# Patient Record
(demographics unavailable — no encounter records)

---

## 2025-02-04 NOTE — REASON FOR VISIT
[CV Risk Factors and Non-Cardiac Disease] : CV risk factors and non-cardiac disease [Arrhythmia/ECG Abnorrmalities] : arrhythmia/ECG abnormalities [Structural Heart and Valve Disease] : structural heart and valve disease [Coronary Artery Disease] : coronary artery disease [Follow-Up - Clinic] : a clinic follow-up of [Atrial Fibrillation] : atrial fibrillation [Hyperlipidemia] : hyperlipidemia [Hypertension] : hypertension [Palpitations] : palpitations [FreeTextEntry2] : Atrial Fibrillation and palpations   [FreeTextEntry1] : Left Bundle Branch Block, hypertrophic cardiomyopathy, s/p myomectomy, subaortic membrane

## 2025-02-04 NOTE — DISCUSSION/SUMMARY
[FreeTextEntry1] : This is a 68-year-old female past medical history significant for moderate aortic stenosis and moderate aortic valve regurgitation, prediabetes, mild renal insufficiency, status post ablation for supraventricular arrhythmia 2022, (supraventricular tachycardia, periods of atrial fibrillation and atrial tachycardia, subaortic membrane, asymmetric septal hypertrophy, left bundle branch block, atrial fibrillation, hypertension, status post left knee replacement surgery, mitral valve regurgitation, status post right hip replacement surgery in , status post right knee replacement surgery, status post cholecystectomy, status post  section who comes in for cardiac follow-up evaluation. Electrocardiogram done 2025 demonstrated normal sinus rhythm rate 60 bpm is otherwise remarkable for left bundle branch block, first-degree atrioventricular block, nonspecific ST wave changes and poor R wave progression The patient is statin intolerance. Lipid panel done 2025 demonstrated cholesterol 204, HDL 67, triglycerides 61, LDL calculated 122 mg/dL, non-HDL cholesterol 137, LDL direct 122 mg/dL, hemoglobin A1c of 6.4, GFR 52 cc/min, and potassium 4.5. The patient does not feel she has the dexterity to self inject PCSK9 therapy.  She would be an excellent candidate for Inclisiran therapy every 6 months with 3 shots in the first year.  I have explained this to the patient in detail.  She is willing to take the Inclisiran injections. She is instructed to follow-up with her primary care physician.  I suggested she find a physician in [facility]. Electrocardiogram done 2024 demonstrated sinus bradycardia rate of 55 bpm is otherwise remarkable for left bundle branch block and nonspecific ST-T wave changes.  There is left axis deviation. Lipid panel done July 3, 2023 demonstrated cholesterol 207, HDL of 60, triglycerides of 88, LDL direct of 132, non-HDL cholesterol 147 mg/dL and hemoglobin A1c of 6.6.  Echo Doppler examination done December 15, 2022 demonstrated ejection fraction of 55% with minimal mitral valve regurgitation, moderate aortic stenosis, moderate aortic valve regurgitation, mild pulmonic valve regurgitation, minimal tricuspid valve regurgitation.  Electrocardiogram done July 3, 2023 demonstrated normal sinus rhythm rate 61 bpm is otherwise markable for left bundle branch block. Transesophageal echo Doppler examination done at [facility] 2022 demonstrated moderate aortic stenosis and moderate aortic valve regurgitation, minimal mitral valve regurgitation, minimal tricuspid valve regurgitation, mild pulmonic valve regurgitation with satisfactory left ventricular function and estimated ejection fraction of approximately 55% with a mildly dilated left ventricle.  Electrocardiogram done 2022 demonstrated normal sinus rhythm rate of 87 bpm is otherwise remarkable for left atrial enlargement. The patient had a transthoracic echo Doppler examination 2022 at [facility] which demonstrated calcified aortic valve leaflet with decreased opening and a mean gradient of 25 mmHg with a peak gradient 46 mmHg; the subaortic membrane cannot be ruled out there is mild aortic valve regurgitation, minimal mitral valve regurgitation, minimal pulmonic valve regurgitation, minimal tricuspid valve regurgitation, A lipid panel done 2022 demonstrated a cholesterol of 198, HDL 67, triglycerides 74, LDL calculated 114 mg/dL, and LDL direct of 119 mg/dL.  Lipoprotein B was 91 mg/dL, and hemoglobin A1c was 5.9.  Electrocardiogram done 2022 demonstrated sinus bradycardia rate of 48 bpm is otherwise remarkable for left atrial abnormality, left axis deviation, left bundle branch block, and borderline first-degree atrioventricular block. Patient lost vision in her right eye, "stroke", and has been worked up by neurology. Echo Doppler examination done 2022 demonstrates moderate aortic stenosis with a peak gradient 49 mmHg, mean gradient 29 mmHg, with an estimated ejection fraction of 54 to 61%, left atrial enlargement, mild concentric left ventricular hypertrophy, mild tricuspid valve regurgitation, mild pulmonic valve regurgitation, calcified mitral annulus and mild mitral valve regurgitation and thinning of the interatrial septum. Blood work done 2022 demonstrated a cholesterol of 198, HDL 67, triglycerides of 74, and  mg/dL with a direct LDL of 119 mg/dL.  Hemoglobin A1c is 5.9.  She continues Xarelto 20 mg daily in the evening for anticoagulation.   A cardiac catheterization  which revealed no coronary artery disease and evidence for hypertrophic cardiomyopathy.   This patient is cleared from a cardiac standpoint for right hip replacement surgery pending acceptable preoperative laboratory values.  The patient will discontinue Xarelto for full days prior to the procedure.  She will stop aspirin therapy 1 week prior to the procedure. Please avoid overhydration.  Maintain prophylaxis for deep venous thrombosis.  The patient should have an incentive spirometer in the perioperative period.  ADDENDUM 3/28/2024:: Blood work done 2024 including basic metabolic profile, INR, and CBC are acceptable for surgery.  The patient is cleared from a cardiac standpoint for surgery.  Please note the above recommendations.

## 2025-02-04 NOTE — HISTORY OF PRESENT ILLNESS
[Scheduled Procedure ___] : a [unfilled] [Surgeon Name ___] : surgeon: [unfilled] [Good] : Good [Stable] : Stable [Electrocardiogram] : ~T an ECG ~C was performed [Echocardiogram] : ~T an echocardiogram ~C was performed [Cardiovascular Stress Test] : a cardiac stress test ~T ~C was performed [FreeTextEntry1] : This is a 64 year year old female here today for follow up cardiac evaluation. \par  She has a past medical history significant for  subaortic membrane, asymmetric septal hypertrophy, left bundle branch block, atrial fibrillation, hypertension, mitral valve regurgitation. Past surgical history is remarkable for status post right knee replacement surgery, status post cholecystectomy, status post  section. The patient had successful  left knee replacement surgery.\par  \par  CHIEF COMPLAINT:\par  Today she is feeling generally well and does not have any complaints at this time. She is here today for pre-op cardiovascular clearance to be done on 10/11/2021 for a biopsy for temporal arteritis evaluation to be done by Dr. Vaughn Ruiz at the [facility].\par  \par  She denies fever, chills, weight loss, malaise, rash, alteration bowel habits, weakness, abdominal  pain, bloating, changes in urination, visual disturbances, chest pain, headaches, dizziness, heart palpitations, recent episodes of syncope or falls at this time.\par  \par

## 2025-02-04 NOTE — PHYSICAL EXAM
[Well Developed] : well developed [Well Nourished] : well nourished [No Acute Distress] : no acute distress [Normal Venous Pressure] : normal venous pressure [No Carotid Bruit] : no carotid bruit [Normal S1, S2] : normal S1, S2 [No Rub] : no rub [No Pitting Edema] : no pitting edema present [No Abnormalities] : the abdominal aorta was not enlarged and no bruit was heard [Clear Lung Fields] : clear lung fields [Good Air Entry] : good air entry [No Respiratory Distress] : no respiratory distress  [Soft] : abdomen soft [Non Tender] : non-tender [No Masses/organomegaly] : no masses/organomegaly [Normal Bowel Sounds] : normal bowel sounds [Normal Gait] : normal gait [No Edema] : no edema [No Cyanosis] : no cyanosis [No Clubbing] : no clubbing [No Varicosities] : no varicosities [No Rash] : no rash [No Skin Lesions] : no skin lesions [Moves all extremities] : moves all extremities [No Focal Deficits] : no focal deficits [Normal Speech] : normal speech [Alert and Oriented] : alert and oriented [Normal memory] : normal memory [General Appearance - Well Developed] : well developed [Normal Appearance] : normal appearance [Well Groomed] : well groomed [General Appearance - Well Nourished] : well nourished [No Deformities] : no deformities [General Appearance - In No Acute Distress] : no acute distress [Normal Conjunctiva] : the conjunctiva exhibited no abnormalities [Normal Oral Mucosa] : normal oral mucosa [Normal Jugular Venous A Waves Present] : normal jugular venous A waves present [Normal Jugular Venous V Waves Present] : normal jugular venous V waves present [No Jugular Venous Deluca A Waves] : no jugular venous deluca A waves [Respiration, Rhythm And Depth] : normal respiratory rhythm and effort [Exaggerated Use Of Accessory Muscles For Inspiration] : no accessory muscle use [Auscultation Breath Sounds / Voice Sounds] : lungs were clear to auscultation bilaterally [Bowel Sounds] : normal bowel sounds [Abdomen Soft] : soft [Abnormal Walk] : normal gait [Gait - Sufficient For Exercise Testing] : the gait was sufficient for exercise testing [Nail Clubbing] : no clubbing of the fingernails [Cyanosis, Localized] : no localized cyanosis [Skin Color & Pigmentation] : normal skin color and pigmentation [] : no rash [No Venous Stasis] : no venous stasis [Skin Lesions] : no skin lesions [No Skin Ulcers] : no skin ulcer [Impaired Insight] : insight and judgment were intact [Oriented To Time, Place, And Person] : oriented to person, place, and time [Affect] : the affect was normal [Mood] : the mood was normal [No Anxiety] : not feeling anxious [5th Left ICS - MCL] : palpated at the 5th LICS in the midclavicular line [Normal] : normal [No Precordial Heave] : no precordial heave was noted [Normal Rate] : normal [Irregularly Irregular] : irregularly irregular [Normal S1] : normal S1 [Normal S2] : normal S2 [No Gallop] : no gallop heard [II] : a grade 2 [2+] : left 2+ [Nonpitting Edema] : nonpitting edema present [Lt] : varicose veins of the left leg noted [Right Femoral Bruit] : no bruit heard over the right femoral artery [Left Femoral Bruit] : no bruit heard over the left femoral artery [Apical Thrill] : no thrill palpable at the apex [S3] : no S3 [S4] : no S4 [Click] : no click [Pericardial Rub] : no pericardial rub [Right Carotid Bruit] : no bruit heard over the right carotid [Left Carotid Bruit] : no bruit heard over the left carotid [Rt] : no varicose veins of the right leg

## 2025-02-04 NOTE — ASSESSMENT
[FreeTextEntry1] : Prior note nurse practitioner Connie 2024::  This is a 67-year-old female here today for follow-up cardiac evaluation.   She has a past medical history significant for moderate aortic stenosis and moderate aortic valve regurgitation, prediabetes, mild renal insufficiency, s/p ablation for supraventricular arrhythmia 2022 (supraventricular tachycardia, periods of atrial fibrillation and atrial tachycardia), hypertrophic cardiomyopathy with subaortic membrane s/p surgical myomectomy, asymmetric septal hypertrophy, left bundle branch block, atrial fibrillation, hypertension, s/p left knee replacement surgery,  s/p right knee replacement surgery, s/p cholecystectomy, s/p  section, s/p right hip replacement .    HPI: She is feeling generally well today and denies chest pain, dizziness, heart palpitations, recent episodes of syncope or falls, SOB, or dyspnea at this time.  Current Medications: Metoprolol Tartrate 25mg PO BID, Amlodipine 5mg PO DAILY and Telmisartan 40mg PO DAILY.   It was recommended she begin statin therapy after 2024 blood work demonstrated triglycerides 62, cholesterol 219, HDL 68, , non-, LDL direct 149 (LDL goal < 70 mg/dL). She has history of significant joint pain on Rosuvastatin 20 mg daily so was prescribed Atorvastatin 40 mg daily, which she has not yet started. Reports that she didn't want to start right before her hip replacement surgery and then forgot about it. She will begin this starting today and repeat her blood work in 4-6 weeks for reevaluation.   BLOOD PRESSURE: -BP is controlled in today's visit   BLOOD WORK:  *LDL target goal < 70* -New blood work prescription given to patient on 2024 to evaluate lipid profile, CBC, BMP, hepatic function, A1C and TSH in 4-6 weeks.  -Lipid panel done 2024 demonstrated triglycerides 62, cholesterol 219, HDL 68, , non-, LDL direct 149. -Lipid panel done July 3, 2023 demonstrated cholesterol 207, HDL of 60, triglycerides of 88, LDL direct of 132, non-HDL cholesterol 147 mg/dL and hemoglobin A1c of 6.6. -Lipid panel done 2022 demonstrated a cholesterol of 198, HDL 67, triglycerides 74, LDL calculated 114 mg/dL, and LDL direct of 119 mg/dL.  Lipoprotein B was 91 mg/dL, and hemoglobin A1c was 5.9. -New blood work was done 2021 which demonstrated LDL of 135.   TESTING/REPORTS: -Electrocardiogram done 2024 demonstrated sinus bradycardia rate of 55 bpm is otherwise remarkable for left bundle branch block and nonspecific ST-T wave changes.  There is left axis deviation.  -Echo done 2022 demonstrated normal left ventricular systolic function EF 55%, minimal mitral regurgitation, mild to moderate aortic regurgitation mild pulmonic regurgitation, minimal tricuspid regurgitation.  -Electrocardiogram done July 3, 2023 demonstrated normal sinus rhythm rate 61 bpm is otherwise markable for left bundle branch block.  -Echo Doppler examination done 2022 demonstrates moderate aortic stenosis with a peak gradient 49 mmHg, mean gradient 29 mmHg, with an estimated ejection fraction of 54 to 61%, left atrial enlargement, mild concentric left ventricular hypertrophy, mild tricuspid valve regurgitation, mild pulmonic valve regurgitation, calcified mitral annulus and mild mitral valve regurgitation and thinning of the interatrial septum.  -Echo Doppler examination done December 15, 2022 demonstrated ejection fraction of 55% with minimal mitral valve regurgitation, moderate aortic stenosis, moderate aortic valve regurgitation, mild pulmonic valve regurgitation, minimal tricuspid valve regurgitation.  -Electrocardiogram done 2022 demonstrated normal sinus rhythm rate of 87 bpm is otherwise remarkable for left atrial enlargement.  -Electrocardiogram done 2022 demonstrated sinus bradycardia rate of 48 bpm is otherwise remarkable for left atrial abnormality, left axis deviation, left bundle branch block, and borderline first-degree atrioventricular block.  -EKG done Oct 08, 2021 which demonstrated regular sinus rhythm with nonspecific ST-T wave changes, BPM of 56 with LBBB.   -A cardiac catheterization  which revealed no coronary artery disease and evidence for hypertrophic cardiomyopathy.   -Echocardiogram done on 2021 demonstrated normal LVSF EF of 65%.  PLAN: -She will continue with her usual medications and will contact the office if she is having any complaints between now and their next follow up appointment. -The patient will schedule an Echo Doppler examination to evaluate murmur, left ventricular function, chamber size, and rule out hypertrophy.  -She will schedule a bilateral carotid doppler to evaluate for carotid artery stenosis. -LATISHA to be completed at next visit to evaluate for PVD.   I have discussed the plan of care with ALEC BANDA and she will follow up in 4-6 months. They are compliant with all of their medications.     The patient understands that aerobic exercises must be increased to 40 minutes 4 times/week and a detailed discussion of lifestyle modification was done today.   The patient has a good understanding of the diagnosis, treatment plan and lifestyle modification.   They will contact me at the office for any questions with their care or any changes in their health status.   The patient was discussed with supervision physician Dr. Brown Bustillos at the time of the visit and the plan of care will be carried out as noted above.     MICHAEL Rosales NP

## 2025-04-23 NOTE — REVIEW OF SYSTEMS
[Negative] : Heme/Lymph [FreeTextEntry3] :  as per HPI  [FreeTextEntry8] :  as per HPI  [de-identified] :  as per HPI

## 2025-04-23 NOTE — ASSESSMENT
[Vaccines Reviewed] : Immunizations reviewed today. Please see immunization details in the vaccine log within the immunization flowsheet.  [FreeTextEntry1] : .  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 HPI as above.  # HTN- controlled - Counselled on low salt diet, exercise as tolerated, weight loss. - c/w current regimen  # HLD - Counselled on diet, exercise as tolerated, weight loss. - f/u lipid panel - statin intolerant, following with cardio for alternative  # pre-DM - Counselled on diet, exercise as tolerated, weight loss. 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 - optho f/u - neuro  - c/w asa 81mg  # afib s/p ablation - c/w xarelto, tolerating well  # LBBB # HCM s/p myomectomy - cardio f/u  # occasional dizziness - past carotid US 2022 with some plaque; f/u repeat - maintain good hydration, avoid sudden position changes  # chronic urinary incontinence since anesthesia for ortho surgery - urology  # HCM - f/u labs - mammo - DEXA - GYN - never had scope, prefers cologuard  f/u pending above w/u

## 2025-04-23 NOTE — PHYSICAL EXAM
[No Acute Distress] : no acute distress [Well-Appearing] : well-appearing [No Lymphadenopathy] : no lymphadenopathy [No Respiratory Distress] : no respiratory distress  [No Accessory Muscle Use] : no accessory muscle use [Clear to Auscultation] : lungs were clear to auscultation bilaterally [Normal Rate] : normal rate  [Regular Rhythm] : with a regular rhythm [Soft] : abdomen soft [Non Tender] : non-tender [Non-distended] : non-distended [Coordination Grossly Intact] : coordination grossly intact [Normal Affect] : the affect was normal [Normal Insight/Judgement] : insight and judgment were intact [de-identified] : systolic murmur

## 2025-04-23 NOTE — HISTORY OF PRESENT ILLNESS
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 + occasional dizziness, not worse with movements, no room spinning + chronic urinary incontinence since anesthesia for ortho surgery  Sx Hx: cholecystectomy, BL knee replacement, R hip replacement, cardiac myomectomy    Family Hx: Mom, sister- DM   Social Hx: never smoker, no alcohol or illicit drug use. Lives with family. Retired, worked for [employer].

## 2025-04-30 NOTE — ASSESSMENT
[FreeTextEntry1] : 67 yo female with urinary urge incontinence. Reviewed behavioral modifications and emphasized timed voiding to empty before strong urge develops. She will also trial oxybutynin 5 mg daily.  Plan Reviewed labs including Cr 1.09 and urinalysis negative for blood but not microscopic UA with microscopic Urine culture Trial oxybutynin 5 mg daily - prescription sent Timed voiding discussed FU 1 month for PVR and assess improvements in incontinent episodes   Patient is being seen today for evaluation and management of a chronic and longitudinal ongoing condition and I am of the primary treating physician.

## 2025-04-30 NOTE — ASSESSMENT
[FreeTextEntry1] : .  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 HPI as above.  # HTN- controlled - Counselled on low salt diet, exercise as tolerated, weight loss. - c/w amlodipine 5mg po daily, telmisartan 40mg po daily, metoprolol succ 200mg po daily  # HLD - Counselled on diet, exercise as tolerated, weight loss. - recent , pt already started back on statin; c/w atorvastatin 40mg po daily - reports past statin intolerance, will monitor   # pre-DM/DM? - Counselled on diet, exercise as tolerated, weight loss. 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 - optho f/u - neuro - c/w asa 81mg  # afib s/p ablation - c/w xarelto 20mg po daily, tolerating well  # LBBB # HCM s/p myomectomy - cardio f/u  # occasional dizziness - past carotid US 2022 with some plaque; f/u repeat - maintain good hydration, avoid sudden position changes  # chronic urinary incontinence since anesthesia for ortho surgery - urology  # HCM - mammo - DEXA - GYN - never had scope, prefers cologuard  f/u 1-2 weeks

## 2025-04-30 NOTE — HISTORY OF PRESENT ILLNESS
[FreeTextEntry1] : 67 yo female with urge incontinence. States it only occurs when she has urge to void and cannot get to the bathroom. It typically occurs about 1 out of 10 times. She does not leak with valsalva. She states she has had 3 prior ortho surgeries and each time she developed some urinary symptoms. However, symptoms have resolved previously. After her last surgery 2 years ago she developed these symptoms and they have continued  States she voids q3 hrs and nocturia x1. Denies small volume voids, dysuria, gross hematuria.

## 2025-04-30 NOTE — HISTORY OF PRESENT ILLNESS
[Home] : at home, [unfilled] , at the time of the visit. [Medical Office: ([facility])___] : at the medical office located in  [Telephone (audio)] : This telephonic visit was provided via audio only technology. [Patient preference] : patient preference. 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 + occasional dizziness, not worse with movements, no room spinning + chronic urinary incontinence since anesthesia for ortho surgery Recent labs: A1c 6.8 Vit D 28  GFR 55, 40 by cystatin

## 2025-06-10 NOTE — PHYSICAL EXAM
[Normal Appearance] : normal appearance [Well Groomed] : well groomed [Edema] : no peripheral edema [General Appearance - In No Acute Distress] : no acute distress [Exaggerated Use Of Accessory Muscles For Inspiration] : no accessory muscle use [Respiration, Rhythm And Depth] : normal respiratory rhythm and effort [Abdomen Soft] : soft [Abdomen Tenderness] : non-tender [Costovertebral Angle Tenderness] : no ~M costovertebral angle tenderness [Urinary Bladder Findings] : the bladder was normal on palpation [Normal Station and Gait] : the gait and station were normal for the patient's age [] : no rash [No Focal Deficits] : no focal deficits [Oriented To Time, Place, And Person] : oriented to person, place, and time [Mood] : the mood was normal [Affect] : the affect was normal [No Palpable Adenopathy] : no palpable adenopathy

## 2025-06-11 NOTE — PHYSICAL EXAM
[No Acute Distress] : no acute distress [Well-Appearing] : well-appearing [No Respiratory Distress] : no respiratory distress  [No Accessory Muscle Use] : no accessory muscle use [Clear to Auscultation] : lungs were clear to auscultation bilaterally [Normal Rate] : normal rate  [Regular Rhythm] : with a regular rhythm [Coordination Grossly Intact] : coordination grossly intact [Normal Affect] : the affect was normal [Normal Insight/Judgement] : insight and judgment were intact [de-identified] : + murmur

## 2025-06-11 NOTE — ASSESSMENT
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  # HTN- controlled - Counselled on low salt diet, exercise as tolerated, weight loss. - c/w amlodipine 5mg po daily, telmisartan 40mg po daily, metoprolol succ 200mg po daily  # HLD - Counselled on diet, exercise as tolerated, weight loss. - c/w atorvastatin 40mg po daily for now - mild arthralgias on two statins now- f/u ESR, CK - cardio f/u for repatha  # new dx DM - Counselled on diet, exercise as tolerated, weight loss. 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 - optho f/u - neuro - c/w asa 81mg  # afib s/p ablation - c/w xarelto 20mg po daily, tolerating well  # LBBB # HCM s/p myomectomy - cardio f/u  # occasional dizziness - past carotid US 2022 with some plaque; f/u repeat - maintain good hydration, avoid sudden position changes - trial meclizine  # chronic urinary incontinence since anesthesia for ortho surgery - urology today  # HCM - mammo - DEXA - GYN - cologuard positive, scheduled with GI  f/u 3-4 months

## 2025-06-11 NOTE — ASSESSMENT
[FreeTextEntry1] : 69 yo female with urinary urge incontinence. She started oxybutynin 5 mg daily and only noticed she was having improvement in symptoms when she stopped the medication. She continues timed voiding q2hrs but develops urgency. She will increase to 10 mg daily and depending on symptomatic improvement and/or SE profile will continue with 10 mg vs 5 mg daily.    Plan PVR done today - 3 ml demonstrating patient emptying bladder Urinalysis reviewed - 0 rbc/hpf. repeat annually Urine culture reviewed - negative Increase oxybutynin to 10 mg daily - prescription sent Continue timed voiding q2 hrs but with increase in oxybutynin dose can increase to longer intervals FU 1 month for PVR and assess improvements in incontinent episodes   Patient is being seen today for evaluation and management of a chronic and longitudinal ongoing condition and I am of the primary treating physician.

## 2025-06-11 NOTE — HISTORY OF PRESENT ILLNESS
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 + report multiple mild joint pains on lipitor, no muscle pain. Also didnt tolerate crestor for same complaint. + occasional dizziness, not worse with movements, no room spinning- stable + chronic urinary incontinence since anesthesia for ortho surgery- seeing urology today

## 2025-06-11 NOTE — HISTORY OF PRESENT ILLNESS
[FreeTextEntry1] : 69 yo female with urge incontinence. States it only occurs when she has urge to void and cannot get to the bathroom. It typically occurs about 1 out of 10 times. She does not leak with valsalva. She states she has had 3 prior ortho surgeries and each time she developed some urinary symptoms. However, symptoms have resolved previously. After her last surgery 2 years ago she developed these symptoms and they have continued  She started oxybutynin and was timed voiding q2 hrs. States she would be able to sleep longer before waking up and had no issues with frequency. STates she has stopped and now she is voiding about every 2 hrs but is getting urgency before gettng to the bathroom and states symptoms have reverted back to before starting medications  PVR - 3 ml